# Patient Record
Sex: FEMALE | Race: OTHER | URBAN - METROPOLITAN AREA
[De-identification: names, ages, dates, MRNs, and addresses within clinical notes are randomized per-mention and may not be internally consistent; named-entity substitution may affect disease eponyms.]

---

## 2018-09-05 ENCOUNTER — OFFICE VISIT (OUTPATIENT)
Dept: FAMILY MEDICINE CLINIC | Age: 28
End: 2018-09-05

## 2018-09-05 VITALS
HEIGHT: 66 IN | DIASTOLIC BLOOD PRESSURE: 56 MMHG | HEART RATE: 84 BPM | BODY MASS INDEX: 25.23 KG/M2 | WEIGHT: 157 LBS | SYSTOLIC BLOOD PRESSURE: 99 MMHG | TEMPERATURE: 98.1 F

## 2018-09-05 DIAGNOSIS — Z00.00 WELL ADULT EXAM: Primary | ICD-10-CM

## 2018-09-05 NOTE — PROGRESS NOTES
HISTORY OF PRESENT ILLNESS Jeanine Verduzco is a 33 y.o. female. HPI Patient is going into [employer]. Had an allergy panel done last year and was negative. She needs a note that states this was negative. No other concerns today Review of Systems Constitutional: Negative for chills, diaphoresis, malaise/fatigue and weight loss. HENT: Negative for congestion, ear discharge, ear pain, hearing loss, nosebleeds and tinnitus. Eyes: Negative for double vision and photophobia. Respiratory: Negative for cough, hemoptysis, sputum production, shortness of breath and wheezing. Cardiovascular: Negative for chest pain, palpitations, orthopnea and claudication. Gastrointestinal: Negative for abdominal pain, blood in stool, constipation, diarrhea, heartburn, melena, nausea and vomiting. Genitourinary: Negative for dysuria, frequency, hematuria and urgency. Musculoskeletal: Negative for back pain, falls, joint pain, myalgias and neck pain. Skin: Negative for itching and rash. Neurological: Negative for dizziness, tingling, tremors, sensory change, speech change, focal weakness and headaches. History reviewed. No pertinent past medical history. History reviewed. No pertinent surgical history. Social History Social History  Marital status: SINGLE Spouse name: N/A  
 Number of children: N/A  
 Years of education: N/A Social History Main Topics  Smoking status: Never Smoker  Smokeless tobacco: Never Used  Alcohol use No  
 Drug use: No  
 Sexual activity: Not Asked Other Topics Concern  None Social History Narrative  None No family history on file. No Known Allergies BP 99/56 (BP 1 Location: Right arm)  Pulse 84  Temp 98.1 °F (36.7 °C) (Oral)   Ht 5' 5.95\" (1.675 m)  Wt 157 lb (71.2 kg)  LMP 08/25/2018  BMI 25.38 kg/m2 Physical Exam  
Constitutional: She is oriented to person, place, and time.  She appears well-developed and well-nourished. No distress. HENT:  
Head: Normocephalic. Right Ear: External ear normal.  
Left Ear: External ear normal.  
Nose: Nose normal.  
Mouth/Throat: No oropharyngeal exudate. Eyes: Conjunctivae and EOM are normal. Pupils are equal, round, and reactive to light. Neck: Normal range of motion. Neck supple. Cardiovascular: Normal rate, regular rhythm and normal heart sounds. No murmur heard. Pulmonary/Chest: Effort normal and breath sounds normal. No respiratory distress. She has no wheezes. She has no rales. Abdominal: Soft. Bowel sounds are normal. She exhibits no distension. There is no tenderness. There is no rebound. Musculoskeletal: Normal range of motion. She exhibits no edema, tenderness or deformity. Neurological: She is alert and oriented to person, place, and time. She has normal reflexes. No cranial nerve deficit. Skin: Skin is warm. No rash noted. ASSESSMENT and PLAN Diagnoses and all orders for this visit: 
 
1. Well adult exam 
 
 
Healthy 33years old. Labs reviewed has negative allergy panel dating back to 2/2017. Letter given to the patient

## 2018-09-05 NOTE — LETTER
9/5/2018 To whom it may concern, This letter serves to inform you that Ms. Ismael Aguilar is a current patient at our facility. She has been tested for allergies and we can confirm Ms. Malaika Colunga is not allergic to latex, flying insects and has no other known allergies. Sincerely, Chin Valle. Christine Adkins MD 
[address] [place] [address] 
[phone]

## 2018-11-27 ENCOUNTER — TELEPHONE (OUTPATIENT)
Dept: FAMILY MEDICINE CLINIC | Age: 28
End: 2018-11-27

## 2018-11-27 NOTE — LETTER
[facility]-MAIN OFFICE AN-MAIN OFFICE 
[address], [address] [place] [address] 
[phone] Date: 11/27/2018 To Whom It May concern: 
 
Carrington Billings was seen at the [facility] once in 9/5/2018. She has no known allergies. She has informed us she is no longer a patient at the [facility]. Sincerely, Antoni Vila MD

## 2018-11-27 NOTE — TELEPHONE ENCOUNTER
Patient, Alannah Vinicius, left message that the letter she received from Dr. Robbie Desai regarding her allergies needs to be revised. She asked that we call her so she can explain. She said she needs the letter for the [employer].     Cyndee Momin April

## 2018-11-30 ENCOUNTER — DOCUMENTATION ONLY (OUTPATIENT)
Dept: FAMILY MEDICINE CLINIC | Age: 28
End: 2018-11-30

## 2018-11-30 NOTE — TELEPHONE ENCOUNTER
Tc to the pt returning her call. The pt stated she was going into the  and she needed a letter stating she has NKA and is no longer a pt of the [facility]. The letter she received from the provider states that she is a current pt of the [facility], and she needs it changed to no longer a pt of the [facility] and has NKA. Routing to the provider.  Elyse Schultz RN

## 2018-11-30 NOTE — PROGRESS NOTES
Patient, Phyllismercedes Alva, called again asking that we revise the letter she needs for the . She is going to come to the clinic either Saturday or Monday to try to get this done.     Mykel Sorensen April

## 2018-12-03 NOTE — TELEPHONE ENCOUNTER
Letter done this am by Dr Lizzie Michele. Pt called and she will  today at [facility].